# Patient Record
Sex: FEMALE | Race: WHITE
[De-identification: names, ages, dates, MRNs, and addresses within clinical notes are randomized per-mention and may not be internally consistent; named-entity substitution may affect disease eponyms.]

---

## 2020-03-22 ENCOUNTER — NURSE TRIAGE (OUTPATIENT)
Dept: OTHER | Facility: CLINIC | Age: 45
End: 2020-03-22

## 2020-03-22 NOTE — TELEPHONE ENCOUNTER
Reason for Disposition   [1] MODERATE headache (e.g., interferes with normal activities) AND [2] present > 24 hours AND [3] unexplained  (Exceptions: analgesics not tried, typical migraine, or headache part of viral illness)    Protocols used: HEADACHE-ADULT-    Caller reports she has had a headache for the past 1 week as well as fatigue. Caller has tried otc pain relievers with little relief. Caller directed to follow up with PCP and also given the option of an e-visit.

## 2020-04-08 ENCOUNTER — NURSE TRIAGE (OUTPATIENT)
Dept: OTHER | Facility: CLINIC | Age: 45
End: 2020-04-08

## 2020-04-08 NOTE — TELEPHONE ENCOUNTER
Pt called in to report:  feverish  Headache-lightheaded/Dizzy  muscle weakness  Headaches  Wheezing  Breathing issues  Ongoing for several weeks    Reason for Disposition   Difficult to awaken or acting confused (e.g., disoriented, slurred speech)    Answer Assessment - Initial Assessment Questions  1. RESPIRATORY STATUS: \"Describe your breathing? \" (e.g., wheezing, shortness of breath, unable to speak, severe coughing)       Wheezing and SOB  2. ONSET: \"When did this breathing problem begin? \"       Several weeks ago  3. PATTERN \"Does the difficult breathing come and go, or has it been constant since it started? \"       Come and go  4. SEVERITY: \"How bad is your breathing? \" (e.g., mild, moderate, severe)     - MILD: No SOB at rest, mild SOB with walking, speaks normally in sentences, can lay down, no retractions, pulse < 100.     - MODERATE: SOB at rest, SOB with minimal exertion and prefers to sit, cannot lie down flat, speaks in phrases, mild retractions, audible wheezing, pulse 100-120.     - SEVERE: Very SOB at rest, speaks in single words, struggling to breathe, sitting hunched forward, retractions, pulse > 120       Mild  5. RECURRENT SYMPTOM: \"Have you had difficulty breathing before? \" If so, ask: \"When was the last time? \" and \"What happened that time? \"       No  6. CARDIAC HISTORY: \"Do you have any history of heart disease? \" (e.g., heart attack, angina, bypass surgery, angioplasty)       No  7. LUNG HISTORY: \"Do you have any history of lung disease? \"  (e.g., pulmonary embolus, asthma, emphysema)      No  8. CAUSE: \"What do you think is causing the breathing problem? \"       unsure  9. OTHER SYMPTOMS: \"Do you have any other symptoms? (e.g., dizziness, runny nose, cough, chest pain, fever)      Dizziness, cough, headaches, muscle weakness  10. PREGNANCY: \"Is there any chance you are pregnant? \" \"When was your last menstrual period? \"        No  11. TRAVEL: \"Have you traveled out of the country in the last month? \" (e.g., travel history, exposures)        No    Protocols used: BREATHING DIFFICULTY-ADULT-[place]

## 2020-04-25 ENCOUNTER — NURSE TRIAGE (OUTPATIENT)
Dept: OTHER | Facility: CLINIC | Age: 45
End: 2020-04-25

## 2020-04-25 NOTE — TELEPHONE ENCOUNTER
Reason for Disposition   [1] MODERATE dizziness (e.g., interferes with normal activities) AND [2] has NOT been evaluated by physician for this  (Exception: dizziness caused by heat exposure, sudden standing, or poor fluid intake)    Protocols used: DIZZINESS - LIGHTHEADEDNESS-ADULT-    Caller reports an on-going headache for the past 1 month. She is also experiencing dizziness at this time. Provided recommendations to help with symptoms. Also recommended caller complete a BS 24/7 VV at this time.

## 2020-04-25 NOTE — TELEPHONE ENCOUNTER
Reason for Disposition   SEVERE dizziness (vertigo) (e.g., unable to walk without assistance)    Protocols used: DIZZINESS - VERTIGO-ADULT-[place]    Caller was triaged and had a VV earlier in the day for dizziness. Caller is calling back  tearful stating that her symptoms are drastically worsening. She states her head is spinning and she feels as though she may pass out. Directed caller to the ED for immediate treatment.

## 2021-05-18 ENCOUNTER — NURSE TRIAGE (OUTPATIENT)
Dept: OTHER | Facility: CLINIC | Age: 46
End: 2021-05-18

## 2021-05-18 NOTE — TELEPHONE ENCOUNTER
EHB-POD 2    Caller states she had covid several months ago and lost her sense of taste and smell. She states approximately 2-3 weeks ago, she began to have metallic taste in her mouth. During soft tx of caller to RN licensed in [place], caller disconnected. Prior to tx, caller stated she may need to call back at a later time. Attention Provider:  Please do not respond through this encounter as the response is not directed to a shared pool.